# Patient Record
Sex: FEMALE | URBAN - METROPOLITAN AREA
[De-identification: names, ages, dates, MRNs, and addresses within clinical notes are randomized per-mention and may not be internally consistent; named-entity substitution may affect disease eponyms.]

---

## 2019-11-05 ENCOUNTER — APPOINTMENT (OUTPATIENT)
Dept: URBAN - METROPOLITAN AREA CLINIC 236 | Age: 59
Setting detail: DERMATOLOGY
End: 2019-11-06

## 2019-11-05 DIAGNOSIS — L81.4 OTHER MELANIN HYPERPIGMENTATION: ICD-10-CM

## 2019-11-05 DIAGNOSIS — L82.0 INFLAMED SEBORRHEIC KERATOSIS: ICD-10-CM

## 2019-11-05 DIAGNOSIS — Z85.828 PERSONAL HISTORY OF OTHER MALIGNANT NEOPLASM OF SKIN: ICD-10-CM

## 2019-11-05 DIAGNOSIS — D22 MELANOCYTIC NEVI: ICD-10-CM

## 2019-11-05 DIAGNOSIS — Z87.2 PERSONAL HISTORY OF DISEASES OF THE SKIN AND SUBCUTANEOUS TISSUE: ICD-10-CM

## 2019-11-05 DIAGNOSIS — F42.4 EXCORIATION (SKIN-PICKING) DISORDER: ICD-10-CM

## 2019-11-05 PROBLEM — D22.72 MELANOCYTIC NEVI OF LEFT LOWER LIMB, INCLUDING HIP: Status: ACTIVE | Noted: 2019-11-05

## 2019-11-05 PROBLEM — D22.39 MELANOCYTIC NEVI OF OTHER PARTS OF FACE: Status: ACTIVE | Noted: 2019-11-05

## 2019-11-05 PROBLEM — S00.80XA UNSPECIFIED SUPERFICIAL INJURY OF OTHER PART OF HEAD, INITIAL ENCOUNTER: Status: ACTIVE | Noted: 2019-11-05

## 2019-11-05 PROCEDURE — 17110 DESTRUCT B9 LESION 1-14: CPT

## 2019-11-05 PROCEDURE — OTHER EDUCATIONAL RESOURCES PROVIDED: OTHER

## 2019-11-05 PROCEDURE — OTHER LIQUID NITROGEN: OTHER

## 2019-11-05 PROCEDURE — OTHER SUNSCREEN RECOMMENDATIONS: OTHER

## 2019-11-05 PROCEDURE — OTHER COUNSELING: OTHER

## 2019-11-05 PROCEDURE — OTHER TREATMENT REGIMEN: OTHER

## 2019-11-05 PROCEDURE — OTHER OBSERVATION AND MEASURE: OTHER

## 2019-11-05 PROCEDURE — OTHER PRESCRIPTION: OTHER

## 2019-11-05 PROCEDURE — OTHER MEDICATION COUNSELING: OTHER

## 2019-11-05 PROCEDURE — 99214 OFFICE O/P EST MOD 30 MIN: CPT | Mod: 25

## 2019-11-05 PROCEDURE — OTHER OBSERVATION: OTHER

## 2019-11-05 RX ORDER — MUPIROCIN 20 MG/G
OINTMENT TOPICAL
Qty: 1 | Refills: 0 | Status: ERX | COMMUNITY
Start: 2019-11-05

## 2019-11-05 RX ORDER — HYDROCORTISONE 2.5 %
OINTMENT (GRAM) TOPICAL
Qty: 1 | Refills: 0 | Status: ERX | COMMUNITY
Start: 2019-11-05

## 2019-11-05 ASSESSMENT — LOCATION DETAILED DESCRIPTION DERM
LOCATION DETAILED: LEFT KNEE
LOCATION DETAILED: RIGHT CHIN
LOCATION DETAILED: SUPERIOR THORACIC SPINE
LOCATION DETAILED: RIGHT BUTTOCK
LOCATION DETAILED: LEFT CENTRAL ZYGOMA
LOCATION DETAILED: LEFT SUPERIOR LATERAL MIDBACK
LOCATION DETAILED: RIGHT ANTERIOR SHOULDER
LOCATION DETAILED: LEFT PROXIMAL PRETIBIAL REGION

## 2019-11-05 ASSESSMENT — LOCATION ZONE DERM
LOCATION ZONE: FACE
LOCATION ZONE: ARM
LOCATION ZONE: LEG
LOCATION ZONE: TRUNK

## 2019-11-05 ASSESSMENT — LOCATION SIMPLE DESCRIPTION DERM
LOCATION SIMPLE: UPPER BACK
LOCATION SIMPLE: CHIN
LOCATION SIMPLE: LEFT LOWER BACK
LOCATION SIMPLE: RIGHT BUTTOCK
LOCATION SIMPLE: LEFT KNEE
LOCATION SIMPLE: RIGHT SHOULDER
LOCATION SIMPLE: LEFT ZYGOMA
LOCATION SIMPLE: LEFT PRETIBIAL REGION

## 2019-11-05 NOTE — PROCEDURE: TREATMENT REGIMEN
Initiate Treatment: hydrocortisone 2.5 % topical ointment \\nApply to affected area on on chin twice a day for two weeks\\n\\nmupirocin 2 % topical ointment \\nApply to affected area on chin twice daily for two weeks
Detail Level: Zone

## 2019-11-05 NOTE — PROCEDURE: SUNSCREEN RECOMMENDATIONS

## 2019-11-05 NOTE — PROCEDURE: OBSERVATION
Size Of Lesion: 4mm
Detail Level: Simple
Detail Level: Detailed
X Size Of Lesion In Cm (Optional): 0
Morphology Per Location (Optional): WHS NER
Morphology Per Location (Optional): Firm flesh toned papule
Size Of Lesion: 1mm
Morphology Per Location (Optional): Medium brown uniformly pigmented macule

## 2019-11-05 NOTE — PROCEDURE: MEDICATION COUNSELING
Xelmichellez Pregnancy And Lactation Text: This medication is Pregnancy Category D and is not considered safe during pregnancy.  The risk during breast feeding is also uncertain.

## 2019-11-05 NOTE — PROCEDURE: LIQUID NITROGEN
Medical Necessity Information: It is in your best interest to select a reason for this procedure from the list below. All of these items fulfill various CMS LCD requirements except the new and changing color options.
Add 52 Modifier (Optional): no
Post-Care Instructions: I reviewed with the patient in detail post-care instructions. Patient is to wear sunprotection, and avoid picking at any of the treated lesions. Pt may apply Vaseline to crusted or scabbing areas.
Medical Necessity Clause: This procedure was medically necessary because the lesions that were treated were:
Consent: The patient's consent was obtained including but not limited to risks of crusting, scabbing, blistering, scarring, darker or lighter pigmentary change, recurrence, incomplete removal and infection.
Include Z78.9 (Other Specified Conditions Influencing Health Status) As An Associated Diagnosis?: Yes
Detail Level: Detailed

## 2020-01-16 ENCOUNTER — APPOINTMENT (OUTPATIENT)
Dept: URBAN - METROPOLITAN AREA CLINIC 236 | Age: 60
Setting detail: DERMATOLOGY
End: 2020-01-16

## 2020-01-16 DIAGNOSIS — L71.8 OTHER ROSACEA: ICD-10-CM

## 2020-01-16 PROCEDURE — OTHER PRESCRIPTION SAMPLES PROVIDED: OTHER

## 2020-01-16 PROCEDURE — OTHER TREATMENT REGIMEN: OTHER

## 2020-01-16 PROCEDURE — OTHER COUNSELING: OTHER

## 2020-01-16 PROCEDURE — OTHER PRESCRIPTION: OTHER

## 2020-01-16 PROCEDURE — 99212 OFFICE O/P EST SF 10 MIN: CPT

## 2020-01-16 RX ORDER — METRONIDAZOLE 10 MG/G
GEL TOPICAL
Qty: 1 | Refills: 0 | Status: ERX | COMMUNITY
Start: 2020-01-16

## 2020-01-16 ASSESSMENT — LOCATION DETAILED DESCRIPTION DERM: LOCATION DETAILED: NASAL DORSUM

## 2020-01-16 ASSESSMENT — LOCATION SIMPLE DESCRIPTION DERM: LOCATION SIMPLE: NOSE

## 2020-01-16 ASSESSMENT — LOCATION ZONE DERM: LOCATION ZONE: NOSE

## 2020-01-16 NOTE — PROCEDURE: TREATMENT REGIMEN
Action 4: Continue
Samples Given: Oracea 40mg time released 1 pill once a day for 16 days
Detail Level: Zone
Start Regimen: metronidazole 1 % topical gel \\nDays Supply: 30\\nApply to affected area once a day at night for maintenance

## 2020-02-11 ENCOUNTER — RX ONLY (RX ONLY)
Age: 60
End: 2020-02-11

## 2020-04-14 NOTE — PROCEDURE: MEDICATION COUNSELING
Rx tobramycin solution qid. Topical Clindamycin Counseling: Patient counseled that this medication may cause skin irritation or allergic reactions.  In the event of skin irritation, the patient was advised to reduce the amount of the drug applied or use it less frequently.   The patient verbalized understanding of the proper use and possible adverse effects of clindamycin.  All of the patient's questions and concerns were addressed.

## 2023-01-17 NOTE — PROCEDURE: MEDICATION COUNSELING
17-Jan-2023 07:00 Niacinamide Pregnancy And Lactation Text: These medications are considered safe during pregnancy.